# Patient Record
Sex: MALE | Race: OTHER | HISPANIC OR LATINO | URBAN - METROPOLITAN AREA
[De-identification: names, ages, dates, MRNs, and addresses within clinical notes are randomized per-mention and may not be internally consistent; named-entity substitution may affect disease eponyms.]

---

## 2020-07-01 ENCOUNTER — EMERGENCY (EMERGENCY)
Facility: HOSPITAL | Age: 45
LOS: 1 days | Discharge: HOME | End: 2020-07-01
Attending: EMERGENCY MEDICINE
Payer: SELF-PAY

## 2020-07-01 VITALS
OXYGEN SATURATION: 99 % | SYSTOLIC BLOOD PRESSURE: 174 MMHG | TEMPERATURE: 98 F | HEART RATE: 114 BPM | HEIGHT: 70 IN | DIASTOLIC BLOOD PRESSURE: 105 MMHG | WEIGHT: 220.02 LBS | RESPIRATION RATE: 16 BRPM

## 2020-07-01 VITALS
RESPIRATION RATE: 18 BRPM | OXYGEN SATURATION: 99 % | SYSTOLIC BLOOD PRESSURE: 155 MMHG | DIASTOLIC BLOOD PRESSURE: 95 MMHG | TEMPERATURE: 98 F | HEART RATE: 87 BPM

## 2020-07-01 PROCEDURE — 99283 EMERGENCY DEPT VISIT LOW MDM: CPT

## 2020-07-01 PROCEDURE — 99282 EMERGENCY DEPT VISIT SF MDM: CPT

## 2020-07-01 RX ORDER — OXYCODONE AND ACETAMINOPHEN 5; 325 MG/1; MG/1
1 TABLET ORAL ONCE
Refills: 0 | Status: DISCONTINUED | OUTPATIENT
Start: 2020-07-01 | End: 2020-07-01

## 2020-07-01 RX ORDER — LIDOCAINE 4 G/100G
1 CREAM TOPICAL ONCE
Refills: 0 | Status: COMPLETED | OUTPATIENT
Start: 2020-07-01 | End: 2020-07-01

## 2020-07-01 RX ORDER — LIDOCAINE 4 G/100G
1 CREAM TOPICAL
Qty: 1 | Refills: 0
Start: 2020-07-01 | End: 2020-07-05

## 2020-07-01 RX ADMIN — LIDOCAINE 1 PATCH: 4 CREAM TOPICAL at 12:19

## 2020-07-01 RX ADMIN — OXYCODONE AND ACETAMINOPHEN 1 TABLET(S): 5; 325 TABLET ORAL at 12:19

## 2020-07-01 NOTE — ED PROVIDER NOTE - NSFOLLOWUPINSTRUCTIONS_ED_ALL_ED_FT
You were seen today for your rib fractures. You were prescribed pain medications. Please take your medications as direction and use your incentive spirometer. Please return to the Emergency Department for worsening signs or symptoms.

## 2020-07-01 NOTE — ED PROVIDER NOTE - CLINICAL SUMMARY MEDICAL DECISION MAKING FREE TEXT BOX
46 yo M with pain from rib fx. Seen at outpatient and had CT scan which showed rib 7-8 fx. Rx already provided for diclofenac and flexeril. IS provided. Rx provided for percocet and lidoderm patched. To follow up as directed. Nontoxic and medically stable for discharge. Return precautions provided and patient understands to return to the ED for worsening signs and symptoms.

## 2020-07-01 NOTE — ED PROVIDER NOTE - NS ED ROS FT
ROS  GENERAL: no fevers, no chills  EYES: no visual changes, no blurry vision    ENT: no epistaxis,  no throat pain  CHEST: no pain with breathing,  no hemoptysis   CARDIAC: no chest pain, no upper back pain   GI: no abdominal pain, no hematemesis, no bright red blood per rectum   : no dysuria, no hematuria   MSK: no arm pain, no leg pain, no back pain, right rib pain   SKIN: no rash   NEURO: no headache, no neck pain   HEME: no easy bruising, no easy bleeding

## 2020-07-01 NOTE — ED ADULT TRIAGE NOTE - CHIEF COMPLAINT QUOTE
c/o difficulty in breathing and pain on breathing , has r sided 7 and rib fracture s/p injured at work yesterday

## 2020-07-01 NOTE — ED ADULT NURSE NOTE - NSIMPLEMENTINTERV_GEN_ALL_ED
Implemented All Universal Safety Interventions:  [place] to call system. Call bell, personal items and telephone within reach. Instruct patient to call for assistance. Room bathroom lighting operational. Non-slip footwear when patient is off stretcher. Physically safe environment: no spills, clutter or unnecessary equipment. Stretcher in lowest position, wheels locked, appropriate side rails in place.

## 2020-07-01 NOTE — ED PROVIDER NOTE - NSFOLLOWUPCLINICS_GEN_ALL_ED_FT
[facility]  [facility]  [address] - [address] [place] [address]  Phone: [phone]  Fax: [phone]  Follow Up Time:

## 2020-07-01 NOTE — ED PROVIDER NOTE - PHYSICAL EXAMINATION
GEN:   comfortable, in no apparent distress, AOx3  EYES:   PERRL, extra-occular movements intact  HEENT:   airway patent, moist mucosal membranes, uvula midline  CV:   regular rate and rhythm, normal S1/S2, no murmur  RESP:   clear to auscultation bilaterally, non-labored, speaking in full sentences  ABD:   soft, non tender, no guarding  :   no cva tenderness  MSK:   tenderness along right 7-8th right lateral rib, no bruising no ecchymosis, 5/5 strength, moving all extremity  SKIN:   dry, intact, no rash  NEURO:   AOx3, no focal weakness or loss of sensation, gait normal, GCS 15 GEN:   comfortable, in no apparent distress, AOx3  EYES:   PERRL, extra-occular movements intact  HEENT:   airway patent, moist mucosal membranes, uvula midline  CV:   regular rate and rhythm, normal S1/S2, no murmur  RESP:   clear to auscultation bilaterally, non-labored, speaking in full sentences  ABD:   soft, non tender, no guarding  :   no cva tenderness  MSK:   tenderness along right 7-8th right lateral rib, no bruising, no ecchymosis, 5/5 strength, moving all extremity  SKIN:   dry, intact, no rash  NEURO:   AOx3, no focal weakness or loss of sensation, gait normal, GCS 15

## 2020-07-01 NOTE — ED PROVIDER NOTE - OBJECTIVE STATEMENT
46 yo M with rib pain s/p work injury. Works for the [employer] and states that he was hit on the right side by a fork lift at work. Had headphones in and was hit while working. Did not hit head or have LOC. Patient with seen with [facility] and had a CT done which showed no PTX or hemothorax or organ lacerations. Remarkable for nondisplaced fx to 7-8th ribs. Was d/c on diclofenac 50mg TID and flexeril 10mg qhs with minimal improvement in symptoms. Bought [employer]pas. Denies fevers, nausea, emesis, abdominal pain, dysuria, hematuria, diarrhea, constipation, or any other acute complaints.

## 2020-07-01 NOTE — ED ADULT NURSE NOTE - OBJECTIVE STATEMENT
Pt. c/o difficulty breathing s/p rib fracture injury. Pt. was seen by urgent care and dx with rib fracture. Pt. stated he is having difficulty taking deep breath and is in pain. VS stable, no signs of difficulty breathing.

## 2020-07-01 NOTE — ED PROVIDER NOTE - PATIENT PORTAL LINK FT
You can access the FollowMyHealth Patient Portal offered by [facility] by registering at the following website: [email or web address]. By joining [email or web address]’s FollowMyHealth portal, you will also be able to view your health information using other applications (apps) compatible with our system.
